# Patient Record
Sex: FEMALE | Race: WHITE | HISPANIC OR LATINO | URBAN - METROPOLITAN AREA
[De-identification: names, ages, dates, MRNs, and addresses within clinical notes are randomized per-mention and may not be internally consistent; named-entity substitution may affect disease eponyms.]

---

## 2024-03-20 ENCOUNTER — HOSPITAL ENCOUNTER (EMERGENCY)
Facility: HOSPITAL | Age: 24
Discharge: HOME | End: 2024-03-21
Attending: EMERGENCY MEDICINE
Payer: COMMERCIAL

## 2024-03-20 VITALS
BODY MASS INDEX: 23.56 KG/M2 | DIASTOLIC BLOOD PRESSURE: 75 MMHG | RESPIRATION RATE: 18 BRPM | TEMPERATURE: 99 F | HEIGHT: 60 IN | SYSTOLIC BLOOD PRESSURE: 115 MMHG | OXYGEN SATURATION: 99 % | WEIGHT: 120 LBS | HEART RATE: 88 BPM

## 2024-03-20 DIAGNOSIS — R10.31 RIGHT LOWER QUADRANT ABDOMINAL PAIN: Primary | ICD-10-CM

## 2024-03-20 DIAGNOSIS — R51.9 ACUTE NONINTRACTABLE HEADACHE, UNSPECIFIED HEADACHE TYPE: ICD-10-CM

## 2024-03-20 DIAGNOSIS — R11.2 NAUSEA AND VOMITING, UNSPECIFIED VOMITING TYPE: ICD-10-CM

## 2024-03-20 LAB
ALBUMIN SERPL BCP-MCNC: 4.2 G/DL (ref 3.5–5.2)
ALP SERPL-CCNC: 62 U/L (ref 55–135)
ALT SERPL W/O P-5'-P-CCNC: 25 U/L (ref 10–44)
ANION GAP SERPL CALC-SCNC: 5 MMOL/L (ref 8–16)
AST SERPL-CCNC: 25 U/L (ref 10–40)
B-HCG UR QL: NEGATIVE
BASOPHILS # BLD AUTO: 0.02 K/UL (ref 0–0.2)
BASOPHILS NFR BLD: 0.3 % (ref 0–1.9)
BILIRUB SERPL-MCNC: 0.4 MG/DL (ref 0.1–1)
BILIRUB UR QL STRIP: NEGATIVE
BUN SERPL-MCNC: 10 MG/DL (ref 6–20)
CALCIUM SERPL-MCNC: 9.3 MG/DL (ref 8.7–10.5)
CHLORIDE SERPL-SCNC: 107 MMOL/L (ref 95–110)
CLARITY UR: CLEAR
CO2 SERPL-SCNC: 25 MMOL/L (ref 23–29)
COLOR UR: YELLOW
CREAT SERPL-MCNC: 0.7 MG/DL (ref 0.5–1.4)
CTP QC/QA: YES
DIFFERENTIAL METHOD BLD: ABNORMAL
EOSINOPHIL # BLD AUTO: 0.1 K/UL (ref 0–0.5)
EOSINOPHIL NFR BLD: 1.6 % (ref 0–8)
ERYTHROCYTE [DISTWIDTH] IN BLOOD BY AUTOMATED COUNT: 11.9 % (ref 11.5–14.5)
EST. GFR  (NO RACE VARIABLE): >60 ML/MIN/1.73 M^2
GLUCOSE SERPL-MCNC: 87 MG/DL (ref 70–110)
GLUCOSE UR QL STRIP: NEGATIVE
HCT VFR BLD AUTO: 38.5 % (ref 37–48.5)
HGB BLD-MCNC: 13.4 G/DL (ref 12–16)
HGB UR QL STRIP: NEGATIVE
IMM GRANULOCYTES # BLD AUTO: 0.02 K/UL (ref 0–0.04)
IMM GRANULOCYTES NFR BLD AUTO: 0.3 % (ref 0–0.5)
KETONES UR QL STRIP: NEGATIVE
LEUKOCYTE ESTERASE UR QL STRIP: NEGATIVE
LIPASE SERPL-CCNC: 22 U/L (ref 4–60)
LYMPHOCYTES # BLD AUTO: 1.8 K/UL (ref 1–4.8)
LYMPHOCYTES NFR BLD: 26.4 % (ref 18–48)
MCH RBC QN AUTO: 33 PG (ref 27–31)
MCHC RBC AUTO-ENTMCNC: 34.8 G/DL (ref 32–36)
MCV RBC AUTO: 95 FL (ref 82–98)
MONOCYTES # BLD AUTO: 0.4 K/UL (ref 0.3–1)
MONOCYTES NFR BLD: 6 % (ref 4–15)
NEUTROPHILS # BLD AUTO: 4.6 K/UL (ref 1.8–7.7)
NEUTROPHILS NFR BLD: 65.4 % (ref 38–73)
NITRITE UR QL STRIP: NEGATIVE
NRBC BLD-RTO: 0 /100 WBC
PH UR STRIP: 6 [PH] (ref 5–8)
PLATELET # BLD AUTO: 235 K/UL (ref 150–450)
PMV BLD AUTO: 10.4 FL (ref 9.2–12.9)
POTASSIUM SERPL-SCNC: 4.4 MMOL/L (ref 3.5–5.1)
PROT SERPL-MCNC: 7.6 G/DL (ref 6–8.4)
PROT UR QL STRIP: NEGATIVE
RBC # BLD AUTO: 4.06 M/UL (ref 4–5.4)
SODIUM SERPL-SCNC: 137 MMOL/L (ref 136–145)
SP GR UR STRIP: 1.02 (ref 1–1.03)
URN SPEC COLLECT METH UR: NORMAL
UROBILINOGEN UR STRIP-ACNC: NEGATIVE EU/DL
WBC # BLD AUTO: 6.96 K/UL (ref 3.9–12.7)

## 2024-03-20 PROCEDURE — 96374 THER/PROPH/DIAG INJ IV PUSH: CPT | Mod: 59

## 2024-03-20 PROCEDURE — 96361 HYDRATE IV INFUSION ADD-ON: CPT

## 2024-03-20 PROCEDURE — 80053 COMPREHEN METABOLIC PANEL: CPT | Performed by: NURSE PRACTITIONER

## 2024-03-20 PROCEDURE — 85025 COMPLETE CBC W/AUTO DIFF WBC: CPT | Performed by: NURSE PRACTITIONER

## 2024-03-20 PROCEDURE — 81003 URINALYSIS AUTO W/O SCOPE: CPT | Performed by: NURSE PRACTITIONER

## 2024-03-20 PROCEDURE — 99285 EMERGENCY DEPT VISIT HI MDM: CPT | Mod: 25

## 2024-03-20 PROCEDURE — 96375 TX/PRO/DX INJ NEW DRUG ADDON: CPT

## 2024-03-20 PROCEDURE — 63600175 PHARM REV CODE 636 W HCPCS: Performed by: NURSE PRACTITIONER

## 2024-03-20 PROCEDURE — 81025 URINE PREGNANCY TEST: CPT | Performed by: EMERGENCY MEDICINE

## 2024-03-20 PROCEDURE — 25500020 PHARM REV CODE 255: Performed by: EMERGENCY MEDICINE

## 2024-03-20 PROCEDURE — 83690 ASSAY OF LIPASE: CPT | Performed by: NURSE PRACTITIONER

## 2024-03-20 PROCEDURE — 25000003 PHARM REV CODE 250: Performed by: NURSE PRACTITIONER

## 2024-03-20 RX ORDER — KETOROLAC TROMETHAMINE 30 MG/ML
10 INJECTION, SOLUTION INTRAMUSCULAR; INTRAVENOUS
Status: COMPLETED | OUTPATIENT
Start: 2024-03-20 | End: 2024-03-20

## 2024-03-20 RX ORDER — ONDANSETRON 4 MG/1
4 TABLET, ORALLY DISINTEGRATING ORAL EVERY 8 HOURS PRN
Qty: 15 TABLET | Refills: 0 | Status: SHIPPED | OUTPATIENT
Start: 2024-03-20

## 2024-03-20 RX ORDER — ONDANSETRON HYDROCHLORIDE 2 MG/ML
4 INJECTION, SOLUTION INTRAVENOUS
Status: COMPLETED | OUTPATIENT
Start: 2024-03-20 | End: 2024-03-20

## 2024-03-20 RX ADMIN — SODIUM CHLORIDE 1000 ML: 9 INJECTION, SOLUTION INTRAVENOUS at 11:03

## 2024-03-20 RX ADMIN — ONDANSETRON 4 MG: 2 INJECTION INTRAMUSCULAR; INTRAVENOUS at 11:03

## 2024-03-20 RX ADMIN — KETOROLAC TROMETHAMINE 10 MG: 30 INJECTION, SOLUTION INTRAMUSCULAR at 11:03

## 2024-03-20 RX ADMIN — IOHEXOL 75 ML: 350 INJECTION, SOLUTION INTRAVENOUS at 12:03

## 2024-03-20 NOTE — ED PROVIDER NOTES
Encounter Date: 3/20/2024       History     Chief Complaint   Patient presents with    Abdominal Pain     Pt c/o RLQ abd pain and dysuria starting today. No pain upon palpation of RLQ. Denies any F/V/D      This is a 24-year-old female with no significant medical history that comes to the emergency room c/o abdominal pain and headache.  Pt reports that she started vomiting 5 days ago (2x/day), and subsequently developed diffuse headache and RLQ pain yesterday.  Pt reports decreased appetite, urinary urgency, and pressure while voiding. She denies fever, diarrhea, URI symptoms, dizziness, vision changes.  Pt attempted relief with a migraine medication (doesn't know name) that her mother had given her without relief.  Pt reports her headache is worse than her right lower abdominal pain, though both are moderate in quality.    The history is provided by the patient and a relative. No  was used.     Review of patient's allergies indicates:  No Known Allergies  History reviewed. No pertinent past medical history.  History reviewed. No pertinent surgical history.  History reviewed. No pertinent family history.  Social History     Tobacco Use    Smoking status: Never    Smokeless tobacco: Never   Substance Use Topics    Drug use: Never     Review of Systems   Constitutional:  Positive for appetite change.   Respiratory:  Negative for cough and shortness of breath.    Cardiovascular:  Negative for chest pain and palpitations.   Gastrointestinal:  Positive for abdominal pain (right lower), nausea and vomiting. Negative for diarrhea.   Genitourinary:  Positive for urgency. Negative for difficulty urinating, vaginal bleeding, vaginal discharge and vaginal pain.   Musculoskeletal:  Negative for back pain.   Skin:  Negative for rash.   Neurological:  Positive for headaches. Negative for dizziness, weakness and numbness.       Physical Exam     Initial Vitals [03/20/24 0946]   BP Pulse Resp Temp SpO2   111/71  80 18 98.7 °F (37.1 °C) 99 %      MAP       --         Physical Exam    Nursing note and vitals reviewed.  Constitutional: She appears well-developed and well-nourished. She is not diaphoretic.   Eyes: Conjunctivae and EOM are normal. Pupils are equal, round, and reactive to light.   Neck: Neck supple.   Normal range of motion.  Pulmonary/Chest: No respiratory distress.   Abdominal: Abdomen is soft. There is abdominal tenderness in the right lower quadrant. There is no tenderness at McBurney's point and negative De Los Santos's sign. negative obturator sign, negative psoas sign and negative Rovsing's sign  Musculoskeletal:         General: Normal range of motion.      Cervical back: Normal range of motion and neck supple.     Neurological: She is alert and oriented to person, place, and time. She has normal strength. No cranial nerve deficit or sensory deficit. Coordination and gait normal.   Skin: Skin is warm and dry.   Psychiatric: She has a normal mood and affect.         ED Course   Procedures  Labs Reviewed   CBC W/ AUTO DIFFERENTIAL - Abnormal; Notable for the following components:       Result Value    MCH 33.0 (*)     All other components within normal limits   COMPREHENSIVE METABOLIC PANEL - Abnormal; Notable for the following components:    Anion Gap 5 (*)     All other components within normal limits   LIPASE   URINALYSIS, REFLEX TO URINE CULTURE    Narrative:     Specimen Source->Urine   POCT URINE PREGNANCY          Imaging Results              CT Abdomen Pelvis With IV Contrast NO Oral Contrast (Final result)  Result time 03/20/24 12:57:34      Final result by Oscar Bowman MD (03/20/24 12:57:34)                   Impression:      No acute process or CT findings identified to explain patient's symptoms of right lower quadrant pain.  Specifically, appendix and terminal ileum are within normal limits.      Electronically signed by: Oscar Bowman MD  Date:    03/20/2024  Time:    12:57                Narrative:    EXAMINATION:  CT ABDOMEN PELVIS WITH IV CONTRAST    CLINICAL HISTORY:  RLQ abdominal pain (Age >= 14y);    TECHNIQUE:  Low dose axial images, sagittal and coronal reformations were obtained from the lung bases to the pubic symphysis following the IV administration of 75 mL of Omnipaque 350 .  Oral contrast was not given.    COMPARISON:  None.    FINDINGS:  Imaged lung bases are clear.  Base of the heart is within normal limits.    Portal vasculature appears patent.  Liver, gallbladder, pancreas, spleen, stomach, duodenum and bilateral adrenal glands are within normal limits.  No biliary ductal dilatation.    Bilateral kidneys are normal in size and location with symmetric normal enhancement noting suspected mild cortical scarring at the right renal upper pole.  Subcentimeter hypoattenuating parenchymal focus at the left renal mid to lower pole which is too small to characterize.  No hydronephrosis or significant perinephric stranding.  Ureters are normal in course and caliber.  Urinary bladder is within normal limits.  Uterus and bilateral adnexa are within normal limits.  Trace volume nonspecific free fluid in the cul-de-sac, commonly physiologic in this age group.    Appendix and terminal ileum are within normal limits.  No evidence of bowel obstruction or acute inflammation.  No pneumatosis or portal venous gas.    No significant atherosclerosis.  No aortic aneurysm or dissection.    No ascites, free air or lymphadenopathy by CT criteria.    Extraperitoneal soft tissues are within normal limits.  Mild levocurvature of the thoracolumbar spine.  Osseous structures otherwise appear intact.                                       Medications   sodium chloride 0.9% bolus 1,000 mL 1,000 mL (0 mLs Intravenous Stopped 3/20/24 1326)   ketorolac injection 9.999 mg (9.999 mg Intravenous Given 3/20/24 1149)   ondansetron injection 4 mg (4 mg Intravenous Given 3/20/24 1149)   iohexoL (OMNIPAQUE 350) injection 75  mL (75 mLs Intravenous Given 3/20/24 0070)     Medical Decision Making  This is an urgent evaluation of a 24-year-old female that presents to emergency room complaining of headache with abdominal pain and nausea and vomiting.  Patient reports symptoms 1st started with nausea and vomiting over the weekend, and yesterday she developed associated headache and right lower quadrant abdominal pain.  She is nontoxic appearing and afebrile with normal vital signs on exam.  She has mild-to-moderate tenderness over the right lower quadrant alone.  She has no CVA tenderness.  Negative De Los Santos's and McBurney's sign.  Labs were unremarkable.  UA without evidence of infection.  CT abdomen and pelvis with IV contrast showed no acute process or CT findings to explain patient's right lower quadrant pain.  The results were discussed with the patient and patient reports total resolution of her abdominal pain, nausea, and headache at this time.  She received IV fluids, Toradol, and Zofran in ED.  She was able to tolerate p.o. challenge.  Overall I suspect gastritis versus peptic ulcer disease, food poisoning, viral syndrome.  I see no evidence to support acute abdomen.  I believe the patient is stable for discharge in outpatient management.  Will give Rx for Zofran and Pepcid.  Return precautions given for any new or worsening symptoms or concerns.    Amount and/or Complexity of Data Reviewed  Labs: ordered.  Radiology: ordered.    Risk  Prescription drug management.                                      Clinical Impression:  Final diagnoses:  [R10.31] Right lower quadrant abdominal pain (Primary)  [R51.9] Acute nonintractable headache, unspecified headache type  [R11.2] Nausea and vomiting, unspecified vomiting type          ED Disposition Condition    Discharge Stable          ED Prescriptions       Medication Sig Dispense Start Date End Date Auth. Provider    ondansetron (ZOFRAN-ODT) 4 MG TbMEREDITH Take 1 tablet (4 mg total) by mouth every  8 (eight) hours as needed (nausea). 15 tablet 3/20/2024 -- Clair Lobato NP          Follow-up Information       Follow up With Specialties Details Why Contact Info    [facility] - Emergency Dept Emergency Medicine  As needed, If symptoms worsen [address] [facility] [place] [address]  [phone]             Clair Lobato NP  03/20/24 1552       Clair Lobato NP  03/21/24 0875

## 2024-03-20 NOTE — Clinical Note
Ronny Hair accompanied their sister(s) to the emergency department on 3/20/2024. They may return to work on 03/21/2024.      If you have any questions or concerns, please don't hesitate to call.      Clair Lobato, NP

## 2024-10-08 ENCOUNTER — HOSPITAL ENCOUNTER (EMERGENCY)
Facility: HOSPITAL | Age: 24
Discharge: HOME | End: 2024-10-08
Attending: EMERGENCY MEDICINE
Payer: COMMERCIAL

## 2024-10-08 VITALS
HEART RATE: 75 BPM | DIASTOLIC BLOOD PRESSURE: 60 MMHG | WEIGHT: 127 LBS | OXYGEN SATURATION: 100 % | TEMPERATURE: 99 F | RESPIRATION RATE: 16 BRPM | BODY MASS INDEX: 23.37 KG/M2 | HEIGHT: 62 IN | SYSTOLIC BLOOD PRESSURE: 108 MMHG

## 2024-10-08 DIAGNOSIS — T14.8XXA MUSCLE STRAIN: ICD-10-CM

## 2024-10-08 DIAGNOSIS — R10.31 RIGHT LOWER QUADRANT ABDOMINAL PAIN: ICD-10-CM

## 2024-10-08 DIAGNOSIS — E87.6 HYPOKALEMIA: Primary | ICD-10-CM

## 2024-10-08 LAB
ALBUMIN SERPL BCP-MCNC: 4.3 G/DL (ref 3.5–5.2)
ALLENS TEST: ABNORMAL
ALP SERPL-CCNC: 76 U/L (ref 55–135)
ALT SERPL W/O P-5'-P-CCNC: 13 U/L (ref 10–44)
ANION GAP SERPL CALC-SCNC: 10 MMOL/L (ref 8–16)
ANION GAP SERPL CALC-SCNC: 15 MMOL/L (ref 8–16)
AST SERPL-CCNC: 19 U/L (ref 10–40)
B-HCG UR QL: NEGATIVE
BASOPHILS # BLD AUTO: 0.03 K/UL (ref 0–0.2)
BASOPHILS NFR BLD: 0.4 % (ref 0–1.9)
BILIRUB SERPL-MCNC: 0.5 MG/DL (ref 0.1–1)
BILIRUB UR QL STRIP: NEGATIVE
BUN SERPL-MCNC: 10 MG/DL (ref 6–30)
BUN SERPL-MCNC: 11 MG/DL (ref 6–20)
CALCIUM SERPL-MCNC: 9.5 MG/DL (ref 8.7–10.5)
CHLORIDE SERPL-SCNC: 106 MMOL/L (ref 95–110)
CHLORIDE SERPL-SCNC: 107 MMOL/L (ref 95–110)
CLARITY UR: CLEAR
CO2 SERPL-SCNC: 21 MMOL/L (ref 23–29)
COLOR UR: YELLOW
CREAT SERPL-MCNC: 0.5 MG/DL (ref 0.5–1.4)
CREAT SERPL-MCNC: 0.7 MG/DL (ref 0.5–1.4)
CTP QC/QA: YES
DIFFERENTIAL METHOD BLD: ABNORMAL
EOSINOPHIL # BLD AUTO: 0.1 K/UL (ref 0–0.5)
EOSINOPHIL NFR BLD: 0.8 % (ref 0–8)
ERYTHROCYTE [DISTWIDTH] IN BLOOD BY AUTOMATED COUNT: 11.7 % (ref 11.5–14.5)
EST. GFR  (NO RACE VARIABLE): >60 ML/MIN/1.73 M^2
GLUCOSE SERPL-MCNC: 127 MG/DL (ref 70–110)
GLUCOSE SERPL-MCNC: 131 MG/DL (ref 70–110)
GLUCOSE UR QL STRIP: NEGATIVE
HCT VFR BLD AUTO: 37.8 % (ref 37–48.5)
HCT VFR BLD CALC: 37 %PCV (ref 36–54)
HGB BLD-MCNC: 13 G/DL (ref 12–16)
HGB UR QL STRIP: NEGATIVE
IMM GRANULOCYTES # BLD AUTO: 0.03 K/UL (ref 0–0.04)
IMM GRANULOCYTES NFR BLD AUTO: 0.4 % (ref 0–0.5)
KETONES UR QL STRIP: NEGATIVE
LEUKOCYTE ESTERASE UR QL STRIP: NEGATIVE
LIPASE SERPL-CCNC: 20 U/L (ref 4–60)
LYMPHOCYTES # BLD AUTO: 1.9 K/UL (ref 1–4.8)
LYMPHOCYTES NFR BLD: 22.6 % (ref 18–48)
MAGNESIUM SERPL-MCNC: 2 MG/DL (ref 1.6–2.6)
MCH RBC QN AUTO: 33 PG (ref 27–31)
MCHC RBC AUTO-ENTMCNC: 34.4 G/DL (ref 32–36)
MCV RBC AUTO: 96 FL (ref 82–98)
MONOCYTES # BLD AUTO: 0.4 K/UL (ref 0.3–1)
MONOCYTES NFR BLD: 5 % (ref 4–15)
NEUTROPHILS # BLD AUTO: 6 K/UL (ref 1.8–7.7)
NEUTROPHILS NFR BLD: 70.8 % (ref 38–73)
NITRITE UR QL STRIP: NEGATIVE
NRBC BLD-RTO: 0 /100 WBC
PH UR STRIP: 7 [PH] (ref 5–8)
PLATELET # BLD AUTO: 245 K/UL (ref 150–450)
PMV BLD AUTO: 10.3 FL (ref 9.2–12.9)
POC IONIZED CALCIUM: 1.27 MMOL/L (ref 1.06–1.42)
POC TCO2 (MEASURED): 22 MMOL/L (ref 23–29)
POTASSIUM BLD-SCNC: 3.4 MMOL/L (ref 3.5–5.1)
POTASSIUM SERPL-SCNC: 3.5 MMOL/L (ref 3.5–5.1)
PROT SERPL-MCNC: 7.9 G/DL (ref 6–8.4)
PROT UR QL STRIP: NEGATIVE
RBC # BLD AUTO: 3.94 M/UL (ref 4–5.4)
SAMPLE: ABNORMAL
SITE: ABNORMAL
SODIUM BLD-SCNC: 138 MMOL/L (ref 136–145)
SODIUM SERPL-SCNC: 138 MMOL/L (ref 136–145)
SP GR UR STRIP: 1.01 (ref 1–1.03)
URN SPEC COLLECT METH UR: NORMAL
UROBILINOGEN UR STRIP-ACNC: NEGATIVE EU/DL
WBC # BLD AUTO: 8.42 K/UL (ref 3.9–12.7)

## 2024-10-08 PROCEDURE — 81003 URINALYSIS AUTO W/O SCOPE: CPT | Performed by: PHYSICIAN ASSISTANT

## 2024-10-08 PROCEDURE — 25500020 PHARM REV CODE 255: Performed by: EMERGENCY MEDICINE

## 2024-10-08 PROCEDURE — 96361 HYDRATE IV INFUSION ADD-ON: CPT

## 2024-10-08 PROCEDURE — 85025 COMPLETE CBC W/AUTO DIFF WBC: CPT

## 2024-10-08 PROCEDURE — 63600175 PHARM REV CODE 636 W HCPCS

## 2024-10-08 PROCEDURE — 99900035 HC TECH TIME PER 15 MIN (STAT)

## 2024-10-08 PROCEDURE — 84132 ASSAY OF SERUM POTASSIUM: CPT

## 2024-10-08 PROCEDURE — 82962 GLUCOSE BLOOD TEST: CPT

## 2024-10-08 PROCEDURE — 80053 COMPREHEN METABOLIC PANEL: CPT

## 2024-10-08 PROCEDURE — 81025 URINE PREGNANCY TEST: CPT | Performed by: PHYSICIAN ASSISTANT

## 2024-10-08 PROCEDURE — 25000003 PHARM REV CODE 250

## 2024-10-08 PROCEDURE — 82565 ASSAY OF CREATININE: CPT

## 2024-10-08 PROCEDURE — 82330 ASSAY OF CALCIUM: CPT

## 2024-10-08 PROCEDURE — 96374 THER/PROPH/DIAG INJ IV PUSH: CPT

## 2024-10-08 PROCEDURE — 85014 HEMATOCRIT: CPT

## 2024-10-08 PROCEDURE — 83735 ASSAY OF MAGNESIUM: CPT

## 2024-10-08 PROCEDURE — 99285 EMERGENCY DEPT VISIT HI MDM: CPT | Mod: 25

## 2024-10-08 PROCEDURE — 84295 ASSAY OF SERUM SODIUM: CPT

## 2024-10-08 PROCEDURE — 83690 ASSAY OF LIPASE: CPT

## 2024-10-08 RX ORDER — SODIUM CHLORIDE 9 MG/ML
1000 INJECTION, SOLUTION INTRAVENOUS
Status: COMPLETED | OUTPATIENT
Start: 2024-10-08 | End: 2024-10-08

## 2024-10-08 RX ORDER — MELOXICAM 7.5 MG/1
7.5 TABLET ORAL DAILY
Qty: 7 TABLET | Refills: 0 | Status: SHIPPED | OUTPATIENT
Start: 2024-10-08

## 2024-10-08 RX ORDER — KETOROLAC TROMETHAMINE 30 MG/ML
15 INJECTION, SOLUTION INTRAMUSCULAR; INTRAVENOUS
Status: COMPLETED | OUTPATIENT
Start: 2024-10-08 | End: 2024-10-08

## 2024-10-08 RX ORDER — ORPHENADRINE CITRATE 100 MG/1
100 TABLET, EXTENDED RELEASE ORAL 2 TIMES DAILY
Qty: 14 TABLET | Refills: 0 | Status: SHIPPED | OUTPATIENT
Start: 2024-10-08 | End: 2024-10-15

## 2024-10-08 RX ADMIN — IOHEXOL 75 ML: 350 INJECTION, SOLUTION INTRAVENOUS at 03:10

## 2024-10-08 RX ADMIN — POTASSIUM BICARBONATE 20 MEQ: 391 TABLET, EFFERVESCENT ORAL at 03:10

## 2024-10-08 RX ADMIN — KETOROLAC TROMETHAMINE 15 MG: 30 INJECTION, SOLUTION INTRAMUSCULAR at 03:10

## 2024-10-08 RX ADMIN — SODIUM CHLORIDE 1000 ML: 9 INJECTION, SOLUTION INTRAVENOUS at 02:10

## 2024-10-08 NOTE — ED PROVIDER NOTES
Encounter Date: 10/8/2024       History     Chief Complaint   Patient presents with    Abdominal Pain     Pt c/o abdominal pain, and right flank pain since last night. Pt denied NVD or pain with urination.     Patient is a 24 y.o. female with no past medical history who presents to the Emergency Department for evaluation of right lower quadrant abdominal pain that began 1 night prior to arrival. Reports symptoms began after playing with her dog and laughing. Denies any specific trauma or injury, or history of recent strenuous activities. She also reports pain to her right upper thigh and right flank. States the abdominal pain and flank pain is constant throbbing and her leg pain is intermittent sharp. States it all feels separate. Pain does not radiate from one area to another. She does report nausea with one episode of vomiting last night. No nausea today. No diarrhea. Reports last bowel movement was last night, non-bloody. No history of constipation.  She denies worsening pain with food intake. She denies history of gallstones, pancreatitis, heavy NSAID use, acid reflux/indigestion, peptic ulcer disease, abdominal surgeries, bowel obstruction. She denies history of ovarian cysts. No concern for pregnancy. Last menstrual cycle two weeks ago. She denies fever, chills, headache, chest pain, shortness of breath, dysuria, urinary frequency, hematuria, vaginal bleeding, vaginal discharge, lightheadedness, or dizziness.       The history is provided by the patient. A  was used ([id number]).     Review of patient's allergies indicates:  No Known Allergies  History reviewed. No pertinent past medical history.  History reviewed. No pertinent surgical history.  No family history on file.  Social History     Tobacco Use    Smoking status: Never    Smokeless tobacco: Never   Substance Use Topics    Drug use: Never     Review of Systems   Constitutional:  Negative for chills and fever.   Respiratory:  Negative  for shortness of breath.    Cardiovascular:  Negative for chest pain.   Gastrointestinal:  Positive for abdominal pain, nausea and vomiting. Negative for blood in stool and diarrhea.   Genitourinary:  Positive for flank pain. Negative for dysuria, frequency, hematuria, vaginal bleeding and vaginal discharge.   Musculoskeletal:  Positive for arthralgias. Negative for back pain, joint swelling, neck pain and neck stiffness.   Skin:  Negative for color change.   Neurological:  Negative for dizziness, light-headedness, numbness and headaches.       Physical Exam     Initial Vitals [10/08/24 1400]   BP Pulse Resp Temp SpO2   108/60 75 16 98.5 °F (36.9 °C) 100 %      MAP       --         Physical Exam    Nursing note and vitals reviewed.  Constitutional: She appears well-developed and well-nourished.   HENT:   Head: Normocephalic and atraumatic.   Right Ear: External ear normal.   Left Ear: External ear normal.   Neck: Carotid bruit is not present.   Normal range of motion.  Cardiovascular:  Normal rate, regular rhythm, normal heart sounds and intact distal pulses.     Exam reveals no gallop and no friction rub.       No murmur heard.  Pulmonary/Chest: Breath sounds normal. No respiratory distress. She has no wheezes. She has no rhonchi. She has no rales.   Abdominal: Abdomen is soft. Bowel sounds are normal. She exhibits no distension. There is abdominal tenderness.   No right CVA tenderness.  No left CVA tenderness. There is no rebound, no guarding, no tenderness at McBurney's point and negative De Los Santos's sign. positive obturator signnegative psoas sign and negative Rovsing's sign  Musculoskeletal:         General: Normal range of motion.      Cervical back: Normal range of motion.     Neurological: She is alert and oriented to person, place, and time. GCS score is 15. GCS eye subscore is 4. GCS verbal subscore is 5. GCS motor subscore is 6.   Psychiatric: She has a normal mood and affect.         ED Course    Procedures  Labs Reviewed   CBC W/ AUTO DIFFERENTIAL - Abnormal       Result Value    WBC 8.42      RBC 3.94 (*)     Hemoglobin 13.0      Hematocrit 37.8      MCV 96      MCH 33.0 (*)     MCHC 34.4      RDW 11.7      Platelets 245      MPV 10.3      Immature Granulocytes 0.4      Gran # (ANC) 6.0      Immature Grans (Abs) 0.03      Lymph # 1.9      Mono # 0.4      Eos # 0.1      Baso # 0.03      nRBC 0      Gran % 70.8      Lymph % 22.6      Mono % 5.0      Eosinophil % 0.8      Basophil % 0.4      Differential Method Automated     COMPREHENSIVE METABOLIC PANEL - Abnormal    Sodium 138      Potassium 3.5      Chloride 107      CO2 21 (*)     Glucose 127 (*)     BUN 11      Creatinine 0.7      Calcium 9.5      Total Protein 7.9      Albumin 4.3      Total Bilirubin 0.5      Alkaline Phosphatase 76      AST 19      ALT 13      eGFR >60      Anion Gap 10     ISTAT PROCEDURE - Abnormal    POC Glucose 131 (*)     POC BUN 10      POC Creatinine 0.5      POC Sodium 138      POC Potassium 3.4 (*)     POC Chloride 106      POC TCO2 (MEASURED) 22 (*)     POC Anion Gap 15      POC Ionized Calcium 1.27      POC Hematocrit 37      Sample VENOUS      Site Other      Allens Test N/A     URINALYSIS, REFLEX TO URINE CULTURE    Specimen UA Urine, Clean Catch      Color, UA Yellow      Appearance, UA Clear      pH, UA 7.0      Specific Gravity, UA 1.015      Protein, UA Negative      Glucose, UA Negative      Ketones, UA Negative      Bilirubin (UA) Negative      Occult Blood UA Negative      Nitrite, UA Negative      Urobilinogen, UA Negative      Leukocytes, UA Negative      Narrative:     Specimen Source->Urine   LIPASE    Lipase 20     MAGNESIUM   MAGNESIUM    Magnesium 2.0     POCT URINE PREGNANCY    POC Preg Test, Ur Negative       Acceptable Yes     ISTAT CHEM8          Imaging Results              CT Abdomen Pelvis With IV Contrast NO Oral Contrast (Final result)  Result time 10/08/24 16:12:54      Final  result by Trevor Dee MD (10/08/24 16:12:54)                   Impression:      1. Prominent mesenteric lymph nodes particularly in the pericecal region.  Developing mesenteric adenitis is a consideration, correlation is advised.  2. Moderate stool in the right colon may reflect developing constipation.  3. Please see above for several additional findings.      Electronically signed by: Trevor Dee MD  Date:    10/08/2024  Time:    16:12               Narrative:    EXAMINATION:  CT ABDOMEN PELVIS WITH IV CONTRAST    CLINICAL HISTORY:  RLQ abdominal pain (Age >= 14y);    TECHNIQUE:  Low dose axial images, sagittal and coronal reformations were obtained from the lung bases to the pubic symphysis following the IV administration of 75 mL of Omnipaque 350 .  Oral contrast was not given.    COMPARISON:  03/20/2024    FINDINGS:  Images of the lower thorax are remarkable for bilateral dependent atelectasis.    The liver, spleen, pancreas, gallbladder and adrenal glands are grossly unremarkable.  The stomach is mildly distended with ingested content without wall thickening.  The portal vein, splenic vein, SMV, celiac axis and SMA all are patent.  No significant abdominal lymphadenopathy.    The kidneys enhance symmetrically without hydronephrosis or nephrolithiasis.  There is a subcentimeter low attenuating lesion within the interpolar region of the left kidney, too small for characterization.  The bilateral ureters are unremarkable without calculi seen.  The urinary bladder is unremarkable.  The uterus and adnexa are unremarkable.  No significant free fluid in the pelvis.    The large bowel is for the most part decompressed distally.  The terminal ileum is unremarkable.  The appendix is unremarkable.  The small bowel is grossly unremarkable.  There are a few scattered prominent mesenteric lymph nodes particularly in the pericecal region.  No focal organized pelvic fluid collection.    No significant inguinal  lymphadenopathy.                                       Medications   0.9%  NaCl infusion (0 mLs Intravenous Stopped 10/8/24 1626)   ketorolac injection 15 mg (15 mg Intravenous Given 10/8/24 1529)   iohexoL (OMNIPAQUE 350) injection 75 mL (75 mLs Intravenous Given 10/8/24 1513)   potassium bicarbonate disintegrating tablet 20 mEq (20 mEq Oral Given 10/8/24 1532)     Medical Decision Making  This is an emergent evaluation of a  24 y.o. female with no past medical history who presents to the Emergency Department for evaluation of right lower quadrant abdominal pain that began 1 night prior to arrival. Reports symptoms began after playing with her dog and laughing. Denies any specific trauma or injury, or history of recent strenuous activities. She also reports pain to her right upper thigh and right flank. States the abdominal pain and flank pain is constant throbbing and her leg pain is intermittent sharp. States it all feels separate.    Patient looks well clinically. Regular rate rhythm without murmurs.  No carotid bruits appreciated on exam. Lungs are clear to auscultation bilaterally.  Abdomen is soft, nontender, non distended, with normal bowel sounds. Positive obturator sign. Negative psoas, Mcburney's point, null's, and rovsings.     Differential diagnosis includes but is not limited to acute appendicitis, ovarian cyst, muscular strain, ureterolithiasis, UTI, pyelonephritis, cholecystitis pancreatitis, constipation.    Workup initiated with basic labs, lipase, Chem 8, urinalysis, UTI, CT abdomen and pelvis with IV contrast.  Ordered fluids, Toradol..  Vital signs, chart, labs, and/or imaging were all reviewed.  See ED course below and interpretations above.  Labs and workup reassuring today.  No signs of appendicitis.  My overall impression is likely muscular strain. Will discharge home with Mobic, Norflex.  Will go ahead and have patient follow up with GI. Patient is very well appearing, and in no acute  distress. Vital signs are reassuring here in the emergency department, patient is afebrile, breathing comfortable, satting 100 % on room air. Patient/Caregiver is stable for discharge at this time.  Patient/Caregiver was informed of results and plan of care. Patient/Caregiver verbalized understanding of care plan. All questions and concerns were addressed. Discussed strict return precautions with the patient/caregiver. Instructed follow up with primary care provider within 1 week.      Félix Arevalo PA-C    DISCLAIMER: This note was prepared with [employer] voice recognition transcription software. Garbled syntax, mangled pronouns, and other bizarre constructions may be attributed to that software system.       Amount and/or Complexity of Data Reviewed  Labs: ordered. Decision-making details documented in ED Course.  Radiology: ordered. Decision-making details documented in ED Course.    Risk  Prescription drug management.               ED Course as of 10/08/24 1645   Tue Oct 08, 2024   1419 BP: 108/60 [TM]   1419 Temp: 98.5 °F (36.9 °C) [TM]   1419 Pulse: 75 [TM]   1419 Resp: 16 [TM]   1419 SpO2: 100 % [TM]   1419 POCT urine pregnancy  Negative. [TM]   1459 CBC auto differential(!)  CBC is without leukocytosis, red blood cell count of 3.94, no anemia.  Normal platelet count. [TM]   1500 ISTAT PROCEDURE(!) [TM]   1500 ISTAT PROCEDURE(!)  Chem 8 shows normal renal function, glucose of 131, potassium of 3.4, no other acute abnormalities.  Added on a magnesium. [TM]   1500 Urinalysis, Reflex to Urine Culture Urine, Clean Catch  Urinalysis showing no signs of infection, negative for nitrites or leukocytes. [TM]   1526 Magnesium  Magnesium normal. Will give supplemental potassium. [TM]   1527 Lipase  Lipase within normal limits, pancreatitis unlikely. [TM]   1627 CT Abdomen Pelvis With IV Contrast NO Oral Contrast  1. Prominent mesenteric lymph nodes particularly in the pericecal region.  Developing mesenteric adenitis is a  consideration, correlation is advised.  2. Moderate stool in the right colon may reflect developing constipation.  3. Please see above for several additional findings.   [TM]   1627 No white count. Serial abdominal exam benign. [TM]   1627 History, physical exam findings, workup today are most consistent with muscular etiology.  Will discharge home with Mobic, Norflex.  Out of an abundance of caution, we will have patient follow up with GI in her primary care doctor.  No emergent pathology identified on today's visit. [TM]      ED Course User Index  [TM] Félix Arevalo PA-C                           Clinical Impression:  Final diagnoses:  [E87.6] Hypokalemia (Primary)  [R10.31] Right lower quadrant abdominal pain  [T14.8XXA] Muscle strain          ED Disposition Condition    Discharge Stable          ED Prescriptions       Medication Sig Dispense Start Date End Date Auth. Provider    meloxicam (MOBIC) 7.5 MG tablet Take 1 tablet (7.5 mg total) by mouth once daily. 7 tablet 10/8/2024 -- Félix Arevalo PA-C    orphenadrine (NORFLEX) 100 mg tablet Take 1 tablet (100 mg total) by mouth 2 (two) times daily. for 7 days 14 tablet 10/8/2024 10/15/2024 Félix Arevalo PA-C          Follow-up Information       Follow up With Specialties Details Why Contact [facility] - Emergency Dept Emergency Medicine Go to  As needed, If symptoms worsen, or new symptoms develop [address] [facility] [place] [address] [phone]    Primary care doctor  Schedule an appointment as soon as possible for a visit in 3 days               Félix Arevalo PA-C  10/08/24 8832

## 2024-10-08 NOTE — DISCHARGE INSTRUCTIONS
ted fue atendido en el departamento de emergencias horika. [place] todos los medicamentos según lo prescrito y johnathon comentamos.  Alisa un seguimiento con un especialista si así se lo indica.  Es importante recordar que algunos problemas son difíciles de diagnosticar y es posible que no se detecten melonie adames visita al Departamento de Emergencias. Asegúrese de hacer un seguimiento con adames médico de atención primaria y revisar con él todos los análisis de laboratorio, imágenes y pruebas que se realizaron melonie esta visita. Algunas pruebas/laboratorios pueden estar fuera del rango normal y requerir un seguimiento que no sea de emergencia y srikanth investigación adicional para ayudar a diagnosticar/excluir/prevenir complicaciones u otras afecciones médicas. Regrese al departamento de emergencias ante cualquier síntoma nuevo o que empeore. Osmin por permitirme cuidar de michaelle triana, fue un placer. ¡Espero que te sientas mejor la próxima vez!

## 2024-10-08 NOTE — ED TRIAGE NOTES
Pt complaining of lower right abdominal pain radiating to right flank since last night unrelieved by otc meds

## 2024-10-08 NOTE — Clinical Note
"Ofelia Ta" Jose Elias De Leon was seen and treated in our emergency department on 10/8/2024.  She may return to work on 10/09/2024.       If you have any questions or concerns, please don't hesitate to call.      Félix Arevalo PA-C"